# Patient Record
Sex: FEMALE | Race: WHITE | NOT HISPANIC OR LATINO
[De-identification: names, ages, dates, MRNs, and addresses within clinical notes are randomized per-mention and may not be internally consistent; named-entity substitution may affect disease eponyms.]

---

## 2018-07-26 ENCOUNTER — APPOINTMENT (OUTPATIENT)
Dept: UROGYNECOLOGY | Facility: CLINIC | Age: 63
End: 2018-07-26
Payer: COMMERCIAL

## 2018-07-26 LAB
BILIRUB UR QL STRIP: NORMAL
CLARITY UR: CLEAR
COLLECTION METHOD: NORMAL
GLUCOSE UR-MCNC: NORMAL
HCG UR QL: 0.2 EU/DL
HGB UR QL STRIP.AUTO: NORMAL
KETONES UR-MCNC: NORMAL
LEUKOCYTE ESTERASE UR QL STRIP: NORMAL
NITRITE UR QL STRIP: NORMAL
PH UR STRIP: 6
PROT UR STRIP-MCNC: NORMAL
SP GR UR STRIP: 1.02

## 2018-07-26 PROCEDURE — 99214 OFFICE O/P EST MOD 30 MIN: CPT

## 2020-12-27 ENCOUNTER — TRANSCRIPTION ENCOUNTER (OUTPATIENT)
Age: 65
End: 2020-12-27

## 2021-01-25 ENCOUNTER — TRANSCRIPTION ENCOUNTER (OUTPATIENT)
Age: 66
End: 2021-01-25

## 2021-01-30 ENCOUNTER — TRANSCRIPTION ENCOUNTER (OUTPATIENT)
Age: 66
End: 2021-01-30

## 2021-09-29 ENCOUNTER — APPOINTMENT (OUTPATIENT)
Dept: PULMONOLOGY | Facility: CLINIC | Age: 66
End: 2021-09-29

## 2022-11-16 ENCOUNTER — APPOINTMENT (OUTPATIENT)
Dept: ORTHOPEDIC SURGERY | Facility: CLINIC | Age: 67
End: 2022-11-16

## 2022-11-16 VITALS — WEIGHT: 198 LBS | HEIGHT: 68 IN | BODY MASS INDEX: 30.01 KG/M2

## 2022-11-16 DIAGNOSIS — Z00.00 ENCOUNTER FOR GENERAL ADULT MEDICAL EXAMINATION W/OUT ABNORMAL FINDINGS: ICD-10-CM

## 2022-11-16 PROCEDURE — 99203 OFFICE O/P NEW LOW 30 MIN: CPT

## 2022-11-16 PROCEDURE — L3908: CPT

## 2022-11-16 PROCEDURE — 73110 X-RAY EXAM OF WRIST: CPT | Mod: RT

## 2022-11-16 RX ORDER — MELOXICAM 15 MG/1
15 TABLET ORAL
Qty: 30 | Refills: 1 | Status: COMPLETED | COMMUNITY
Start: 2022-11-16 | End: 2023-01-15

## 2022-11-16 NOTE — ASSESSMENT
[FreeTextEntry1] : Mobic rx\par R wrist brace\par activity modification\par fu 2 weeks with dr leo, consider csi if not improved

## 2022-11-16 NOTE — HISTORY OF PRESENT ILLNESS
[8] : 8 [0] : 0 [Localized] : localized [Sharp] : sharp [Intermittent] : intermittent [Full time] : Work status: full time [de-identified] : 67 RHD female here with right wrist pain. No injury. Pain for a few months. Painover ulnar side of her wrist. No prior hand issues. Denies N/T. Denies trauma. has not tried any pain medication.  [] : Post Surgical Visit: no [FreeTextEntry1] : Rt hand/wrist [FreeTextEntry5] : Patient complains of wrist pains since 1 month ago\par no injury

## 2022-12-01 ENCOUNTER — APPOINTMENT (OUTPATIENT)
Dept: ORTHOPEDIC SURGERY | Facility: CLINIC | Age: 67
End: 2022-12-01

## 2022-12-01 VITALS — BODY MASS INDEX: 30.01 KG/M2 | HEIGHT: 68 IN | WEIGHT: 198 LBS

## 2022-12-01 DIAGNOSIS — S69.81XA OTHER SPECIFIED INJURIES OF RIGHT WRIST, HAND AND FINGER(S), INITIAL ENCOUNTER: ICD-10-CM

## 2022-12-01 DIAGNOSIS — M25.511 PAIN IN RIGHT SHOULDER: ICD-10-CM

## 2022-12-01 DIAGNOSIS — M75.101 UNSPECIFIED ROTATOR CUFF TEAR OR RUPTURE OF RIGHT SHOULDER, NOT SPECIFIED AS TRAUMATIC: ICD-10-CM

## 2022-12-01 PROCEDURE — 99214 OFFICE O/P EST MOD 30 MIN: CPT | Mod: 25

## 2022-12-01 PROCEDURE — 20610 DRAIN/INJ JOINT/BURSA W/O US: CPT | Mod: RT

## 2022-12-01 PROCEDURE — J3490N: CUSTOM

## 2022-12-01 PROCEDURE — 73030 X-RAY EXAM OF SHOULDER: CPT | Mod: RT

## 2022-12-01 NOTE — IMAGING
[Right] : right shoulder [Type 2 acromion] : Type 2 acromion [de-identified] : Right wrist with ttp over the TFCC region with minimally positive TFCC Grind Test.\par Strength is 5/5 in all planes.\par ROM is full with no significant pain noted.\par Davis and Finkelstein Tests are negative.\par Tinel sign is negative over the carpal tunnel.\par All digits are nvi with FAROM. \par \par Right shoulder with no skin change/bony deformity.\par TTP over lateral subacromial region.\par +impingement Signs noted.\par Walker Test is negative.\par Speed and Yergason Signs are negative.\par Posterior Lift Off is negative. \par Apprehension Sign is negative. [FreeTextEntry1] : no acute bony pathology / bone island noted to proximal humerus.

## 2022-12-01 NOTE — ASSESSMENT
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

## 2022-12-01 NOTE — PROCEDURE
[Large Joint Injection] : Large joint injection [Right] : of the right [Subacromial Space] : subacromial space [Pain] : pain [Inflammation] : inflammation [Alcohol] : alcohol [Ethyl Chloride sprayed topically] : ethyl chloride sprayed topically [Sterile technique used] : sterile technique used [___ cc    1%] : Lidocaine ~Vcc of 1%  [___ cc    0.5%] : Bupivacaine (Marcaine) ~Vcc of 0.5%  [___ cc    40mg] : Triamcinolone (Kenalog) ~Vcc of 40 mg  [] : Patient tolerated procedure well [Call if redness, pain or fever occur] : call if redness, pain or fever occur [Apply ice for 15min out of every hour for the next 12-24 hours as tolerated] : apply ice for 15 minutes out of every hour for the next 12-24 hours as tolerated [Previous OTC use and PT nontherapeutic] : patient has tried OTC's including aspirin, Ibuprofen, Aleve, etc or prescription NSAIDS, and/or exercises at home and/or physical therapy without satisfactory response [Patient had decreased mobility in the joint] : patient had decreased mobility in the joint [Risks, benefits, alternatives discussed / Verbal consent obtained] : the risks benefits, and alternatives have been discussed, and verbal consent was obtained

## 2022-12-01 NOTE — HISTORY OF PRESENT ILLNESS
[6] : 6 [2] : 2 [Intermittent] : intermittent [Nothing helps with pain getting better] : Nothing helps with pain getting better [Full time] : Work status: full time [de-identified] : 12/1/2022: Pt here for fu s/p treatment for right TFCC injury treated with bracing and otc nsaid with good pain relief.\par  states her pain to the wrist has improved, however she states that her right shoulder has been bothering her over the past few weeks, with no hx of trauma.\par Pain is worse with arm elevation. \par Pt denies associated numbness/tingling. \par \par Urgent Care note below:\par 11/16/2022: 67 RHD female here with right wrist pain. No injury. Pain for a few months. Painover ulnar side of her wrist. No prior hand issues. Denies N/T. Denies trauma. has not tried any pain medication.  [] : no [FreeTextEntry5] : 12/01/2022 :JOSE DANIEL ROBERT , a 67 year old female, presents today for new consultation  right wrist pain, was seen by LORRAINE Robert had xrays in [place] office.

## 2022-12-01 NOTE — REASON FOR VISIT
[FreeTextEntry2] : 12/01/2022 :JOSE DANIEL ROBERT , a 67 year old female, presents today for new consultation  right wrist pain, was seen by LORARINE Robert had xrays in [place] office.\par

## 2023-01-12 ENCOUNTER — APPOINTMENT (OUTPATIENT)
Dept: ORTHOPEDIC SURGERY | Facility: CLINIC | Age: 68
End: 2023-01-12

## 2023-03-25 ENCOUNTER — APPOINTMENT (OUTPATIENT)
Dept: ORTHOPEDIC SURGERY | Facility: CLINIC | Age: 68
End: 2023-03-25
Payer: MEDICARE

## 2023-03-25 VITALS — BODY MASS INDEX: 30.01 KG/M2 | HEIGHT: 68 IN | WEIGHT: 198 LBS

## 2023-03-25 DIAGNOSIS — S93.602A UNSPECIFIED SPRAIN OF LEFT FOOT, INITIAL ENCOUNTER: ICD-10-CM

## 2023-03-25 DIAGNOSIS — S93.601A UNSPECIFIED SPRAIN OF RIGHT FOOT, INITIAL ENCOUNTER: ICD-10-CM

## 2023-03-25 PROCEDURE — L4361: CPT | Mod: KX,RT

## 2023-03-25 PROCEDURE — 99214 OFFICE O/P EST MOD 30 MIN: CPT

## 2023-03-25 NOTE — DATA REVIEWED
[FreeTextEntry1] : OUTSIDE XRs 3/24/23 BL FEET:  Displaced, comminuted R MT fracture.  ?L fracture of 1st distal phalanx vs osteophyte.

## 2023-03-25 NOTE — HISTORY OF PRESENT ILLNESS
[Sudden] : sudden [9] : 9 [10] : 10 [Dull/Aching] : dull/aching [] : no [FreeTextEntry1] : feet [FreeTextEntry3] : 3/24/23 [FreeTextEntry5] : she tripped over a step and fell. R is worse. in wheelchair. she went to [facility] yesterday and had xrays,splinted, was told fx [de-identified] : activity [de-identified] : Right toe sx over 10 years ago

## 2023-03-25 NOTE — ASSESSMENT
[FreeTextEntry1] : We reviewed the findings and her options.  Discussed poor healing of 5th MT fractures.  Tall CAM boot applied.  She has crutches home, but was instructed on proper use in the office today.  Rx for knee scooter given.  She has an upcoming epidural (3/29/23) for LS radiculitis.  Today is the last day she can take NSAIDs.  Meds outlined.  Will see Dr. Schaffer on Monday for future management.

## 2023-03-25 NOTE — REASON FOR VISIT
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  She had immediate pain.  She has had stress fractures previously.  No osteoporosis (last DEXA in 2022).  She takes Vitamin D but has hypercalcemia, so no calcium supplementation.  Was seen at [facility] ED and dx with right 5th MT fracture and ?L big toe fracture.  On Tylenol and icing.  In SLS on the right, supportive cross training sneaker on the left.  No numnbess.

## 2023-03-25 NOTE — PHYSICAL EXAM
[Right] : right foot and ankle [Moderate] : moderate swelling of lateral foot [5th] : 5th [Left] : left foot and ankle [NL (40)] : plantar flexion 40 degrees [NL 30)] : inversion 30 degrees [NL (20)] : eversion 20 degrees [2+] : posterior tibialis pulse: 2+ [] : no laceration [FreeTextEntry9] : ROM and strength not stressed.  Good passive ROM.  No signs of instability or gross ligament injury. [FreeTextEntry8] : Minimal gross dorsal foot tenderness.  Non tender 1st toe.

## 2023-03-27 ENCOUNTER — APPOINTMENT (OUTPATIENT)
Dept: ORTHOPEDIC SURGERY | Facility: CLINIC | Age: 68
End: 2023-03-27
Payer: MEDICARE

## 2023-03-27 VITALS — HEIGHT: 68 IN | BODY MASS INDEX: 30.01 KG/M2 | WEIGHT: 198 LBS

## 2023-03-27 DIAGNOSIS — M20.22 HALLUX RIGIDUS, LEFT FOOT: ICD-10-CM

## 2023-03-27 DIAGNOSIS — M79.673 PAIN IN UNSPECIFIED FOOT: ICD-10-CM

## 2023-03-27 PROCEDURE — 73630 X-RAY EXAM OF FOOT: CPT | Mod: 50

## 2023-03-27 PROCEDURE — 99214 OFFICE O/P EST MOD 30 MIN: CPT | Mod: 25

## 2023-03-27 PROCEDURE — 28470 CLTX METATARSAL FX WO MNP EA: CPT

## 2023-03-27 NOTE — HISTORY OF PRESENT ILLNESS
[10] : 10 [9] : 9 [Dull/Aching] : dull/aching [de-identified] : 03/27/2023: 3 days foot pain from missing a step. Right sided lateral midfoot pain. Left sided hallux pain. Had xrays from ED, then saw [facility] - given tall cam and crutches. wb in boot w/ crutches.  h/o stress fx 10+ yrs ago. no n/t. +dm (a1c 6.4). no tob [] : Post Surgical Visit: no [FreeTextEntry1] : ryan foot [FreeTextEntry3] : 3.24.23

## 2023-03-27 NOTE — ASSESSMENT
[FreeTextEntry1] : nwb in cam boot\par ice/elevate\par nsaids prn\par patient asking about surgical treatment -- advised zone 1 fxs due typically heal with nonsurgical care and that she would be at increased risk of complication in setting of dm. will proceed with non surgical care\par f/up 3 wks w/ R foot xray

## 2023-03-27 NOTE — PHYSICAL EXAM
[Right] : right foot and ankle [Mild] : mild swelling of lateral foot [Left] : left foot and ankle [1st] : 1st [NL (40)] : plantar flexion 40 degrees [NL 30)] : inversion 30 degrees [NL (20)] : eversion 20 degrees [5___] : eversion 5[unfilled]/5 [2+] : dorsalis pedis pulse: 2+ [] : Sensation present to light touch in all distributions [TWNoteComboBox7] : dorsiflexion 15 degrees

## 2023-04-03 ENCOUNTER — APPOINTMENT (OUTPATIENT)
Dept: ORTHOPEDIC SURGERY | Facility: CLINIC | Age: 68
End: 2023-04-03
Payer: MEDICARE

## 2023-04-03 VITALS — BODY MASS INDEX: 30.01 KG/M2 | WEIGHT: 198 LBS | HEIGHT: 68 IN

## 2023-04-03 DIAGNOSIS — S92.351A DISPLACED FRACTURE OF FIFTH METATARSAL BONE, RIGHT FOOT, INITIAL ENCOUNTER FOR CLOSED FRACTURE: ICD-10-CM

## 2023-04-03 PROCEDURE — 99024 POSTOP FOLLOW-UP VISIT: CPT

## 2023-04-03 NOTE — ASSESSMENT
[FreeTextEntry1] : Discussed importance of ice and elevation\par Went over proper elevation techniques \par 2+ pulses and cap refill on exam\par Follow up with Dr. Schaffer as scheduled - will come sooner if any other issues

## 2023-04-03 NOTE — PHYSICAL EXAM
[Right] : right foot and ankle [Moderate] : moderate swelling of lateral foot [5___] : dorsiflexion 5[unfilled]/5 [2+] : posterior tibialis pulse: 2+ [] : Sensation present to light touch in all distributions [de-identified] : NWB with cam boot and crutches [de-identified] : plantar flexion 25 degrees [TWNoteComboBox7] : dorsiflexion 10 degrees

## 2023-04-03 NOTE — HISTORY OF PRESENT ILLNESS
[8] : 8 [de-identified] : Patient is a 66 yo female c/o n/t in right foot. Currently being treated for 5th metatarsal fracture NWB in cam boot with Dr. Schaffer. States she has not been elevating as much as she should due to work. Taking advil as needed for pain.  [FreeTextEntry5] : pt c.o pain in rt foot, pt states previous treatment with Dr. Schaffer 03/27/23\par pt states she had an epidural past wednesday

## 2023-04-17 ENCOUNTER — APPOINTMENT (OUTPATIENT)
Dept: ORTHOPEDIC SURGERY | Facility: CLINIC | Age: 68
End: 2023-04-17
Payer: MEDICARE

## 2023-04-17 PROCEDURE — 73630 X-RAY EXAM OF FOOT: CPT | Mod: RT

## 2023-04-17 PROCEDURE — 99024 POSTOP FOLLOW-UP VISIT: CPT

## 2023-04-17 NOTE — HISTORY OF PRESENT ILLNESS
[7] : 7 [5] : 5 [Dull/Aching] : dull/aching [de-identified] : 03/27/2023: 3 days foot pain from missing a step. Right sided lateral midfoot pain. Left sided hallux pain. Had xrays from ED, then saw [facility] - given tall cam and crutches. wb in boot w/ crutches.  h/o stress fx 10+ yrs ago. no n/t. +dm (a1c 6.4). no tob\par \par 4/17/23: continued pain. fully weight bearing in boot despite instructions.  [FreeTextEntry1] : ryan foot [] : Post Surgical Visit: no [FreeTextEntry3] : 3.24.23

## 2023-04-17 NOTE — PHYSICAL EXAM
[Right] : right foot and ankle [Mild] : mild swelling of lateral foot [Left] : left foot and ankle [1st] : 1st [NL (40)] : plantar flexion 40 degrees [NL 30)] : inversion 30 degrees [NL (20)] : eversion 20 degrees [5___] : eversion 5[unfilled]/5 [2+] : dorsalis pedis pulse: 2+ [TWNoteComboBox7] : dorsiflexion 15 degrees [] : negative anterior drawer at ankle

## 2023-04-17 NOTE — ASSESSMENT
[FreeTextEntry1] : nwb in cam boot -- reiterated importance. not compliant currently\par ice/elevate\par nsaids prn\par discussed risk of nonunion with continued weight bearing\par f/up 3 wks w/ foot xray

## 2023-04-30 ENCOUNTER — APPOINTMENT (OUTPATIENT)
Dept: ORTHOPEDIC SURGERY | Facility: CLINIC | Age: 68
End: 2023-04-30
Payer: MEDICARE

## 2023-04-30 VITALS — BODY MASS INDEX: 30.01 KG/M2 | WEIGHT: 198 LBS | HEIGHT: 68 IN

## 2023-04-30 PROCEDURE — L3260: CPT | Mod: KX,RT

## 2023-04-30 PROCEDURE — 99213 OFFICE O/P EST LOW 20 MIN: CPT | Mod: 24

## 2023-04-30 PROCEDURE — L4361: CPT | Mod: KX,RT

## 2023-04-30 PROCEDURE — 73630 X-RAY EXAM OF FOOT: CPT | Mod: RT

## 2023-04-30 RX ORDER — CELECOXIB 200 MG/1
200 CAPSULE ORAL TWICE DAILY
Qty: 60 | Refills: 0 | Status: ACTIVE | COMMUNITY
Start: 2023-04-30 | End: 1900-01-01

## 2023-04-30 NOTE — ASSESSMENT
[FreeTextEntry1] : A/P R 5th MT fracture\par - WBAT\par - cam walker and HSS applied to patient \par - ice/elevation\par - f/u foot/ankle as scheduled\par

## 2023-04-30 NOTE — HISTORY OF PRESENT ILLNESS
[10] : 10 [7] : 7 [de-identified] : 68 y/o F s/p fall yesterday with R foot pain. she has R foot 5th MT fracture being treated by dr. campbell frazier 5 weeks ago. in cam walker, WBAT. denies numbness/tingling. [FreeTextEntry5] : pt fell yesterday 04/29/23 and injured rt foot, pt saw dr. hightower 03/27/23 and had a boot \par

## 2023-04-30 NOTE — IMAGING
[de-identified] : PE R foot: +swelling to lateral aspect of foot, +tenderness over 5th MT, no tenderness at lisfranc area, no tenderness at ankle, EHL/FHL/ADF/APF intact, sensory intact, calves soft, NT\par  [Right] : right foot [The fracture is in acceptable alignment. There is progression in healing seen] : The fracture is in acceptable alignment. There is progression in healing seen [de-identified] : known R 5th MT fracture

## 2023-05-04 ENCOUNTER — APPOINTMENT (OUTPATIENT)
Dept: ORTHOPEDIC SURGERY | Facility: CLINIC | Age: 68
End: 2023-05-04
Payer: MEDICARE

## 2023-05-04 VITALS — BODY MASS INDEX: 30.01 KG/M2 | HEIGHT: 68 IN | WEIGHT: 198 LBS

## 2023-05-04 PROCEDURE — 99024 POSTOP FOLLOW-UP VISIT: CPT

## 2023-05-04 NOTE — IMAGING
[Right] : right foot [Outside films reviewed] : Outside films reviewed [de-identified] : 5th mt avuslion fx - no sig healing seen

## 2023-05-04 NOTE — HISTORY OF PRESENT ILLNESS
[7] : 7 [5] : 5 [Dull/Aching] : dull/aching [de-identified] : 03/27/2023: 3 days foot pain from missing a step. Right sided lateral midfoot pain. Left sided hallux pain. Had xrays from ED, then saw [facility] - given tall cam and crutches. wb in boot w/ crutches.  h/o stress fx 10+ yrs ago. no n/t. +dm (a1c 6.4). no tob\par \par 4/17/23: continued pain. fully weight bearing in boot despite instructions. \par \par 05/04/2023: had new fall since last visit. remains non compliant with weight bearing precautions. using short boot [] : Post Surgical Visit: no [FreeTextEntry1] : ryan foot [FreeTextEntry3] : 3.24.23

## 2023-05-04 NOTE — PHYSICAL EXAM
[Right] : right foot and ankle [Mild] : mild swelling of lateral foot [Left] : left foot and ankle [1st] : 1st [NL (40)] : plantar flexion 40 degrees [NL 30)] : inversion 30 degrees [NL (20)] : eversion 20 degrees [5___] : eversion 5[unfilled]/5 [2+] : dorsalis pedis pulse: 2+ [] : negative anterior drawer at ankle [TWNoteComboBox7] : dorsiflexion 15 degrees

## 2023-05-04 NOTE — ASSESSMENT
[FreeTextEntry1] : nwb in cam boot -- reiterated importance. remains non compliant. new rx for walker given today\par ice/elevate\par nsaids prn\par discussed risk of nonunion especially in setting of continued non compliance. discussed nonunion and possible surgery as outcome if this occurs. patient states understanding of this\par f/up 3 wks w/ foot xray

## 2023-05-18 ENCOUNTER — TRANSCRIPTION ENCOUNTER (OUTPATIENT)
Age: 68
End: 2023-05-18

## 2023-05-18 ENCOUNTER — APPOINTMENT (OUTPATIENT)
Dept: ORTHOPEDIC SURGERY | Facility: CLINIC | Age: 68
End: 2023-05-18
Payer: MEDICARE

## 2023-05-18 DIAGNOSIS — S92.351D DISPLACED FRACTURE OF FIFTH METATARSAL BONE, RIGHT FOOT, SUBSEQUENT ENCOUNTER FOR FRACTURE WITH ROUTINE HEALING: ICD-10-CM

## 2023-05-18 PROCEDURE — 73630 X-RAY EXAM OF FOOT: CPT | Mod: RT

## 2023-05-18 PROCEDURE — 99024 POSTOP FOLLOW-UP VISIT: CPT

## 2023-05-18 NOTE — ASSESSMENT
[FreeTextEntry1] : protected wb in cam boot\par ice/elevate\par nsaids prn\par discussed risk of nonunion especially in setting of continued non compliance. discussed nonunion and possible surgery as outcome if this occurs. patient states understanding of this\par f/up 3 wks w/ foot xray

## 2023-05-18 NOTE — PHYSICAL EXAM
[Right] : right foot and ankle [Mild] : mild swelling of lateral foot [Left] : left foot and ankle [1st] : 1st [NL (40)] : plantar flexion 40 degrees [NL 30)] : inversion 30 degrees [NL (20)] : eversion 20 degrees [5___] : eversion 5[unfilled]/5 [2+] : dorsalis pedis pulse: 2+ [] : negative anterior drawer at ankle [FreeTextEntry8] : improved [de-identified] : rollator [TWNoteComboBox7] : dorsiflexion 15 degrees

## 2023-05-18 NOTE — IMAGING
[Right] : right foot [Outside films reviewed] : Outside films reviewed [de-identified] : 5th mt avuslion fx - no sig healing seen

## 2023-05-18 NOTE — HISTORY OF PRESENT ILLNESS
[7] : 7 [5] : 5 [Dull/Aching] : dull/aching [de-identified] : 03/27/2023: 3 days foot pain from missing a step. Right sided lateral midfoot pain. Left sided hallux pain. Had xrays from ED, then saw [facility] - given tall cam and crutches. wb in boot w/ crutches.  h/o stress fx 10+ yrs ago. no n/t. +dm (a1c 6.4). no tob\par \par 4/17/23: continued pain. fully weight bearing in boot despite instructions. \par \par 05/04/2023: had new fall since last visit. remains non compliant with weight bearing precautions. using short boot\par \par 05/18/2023: no pain.  Mostly NWB in boot using scooter [] : Post Surgical Visit: no [FreeTextEntry1] : ryan foot [FreeTextEntry3] : 3.24.23

## 2023-06-08 ENCOUNTER — APPOINTMENT (OUTPATIENT)
Dept: ORTHOPEDIC SURGERY | Facility: CLINIC | Age: 68
End: 2023-06-08
Payer: MEDICARE

## 2023-06-08 VITALS — BODY MASS INDEX: 30.01 KG/M2 | WEIGHT: 198 LBS | HEIGHT: 68 IN

## 2023-06-08 DIAGNOSIS — S92.351G DISPLACED FRACTURE OF FIFTH METATARSAL BONE, RIGHT FOOT, SUBSEQUENT ENCOUNTER FOR FRACTURE WITH DELAYED HEALING: ICD-10-CM

## 2023-06-08 PROCEDURE — 73630 X-RAY EXAM OF FOOT: CPT | Mod: RT

## 2023-06-08 PROCEDURE — 99024 POSTOP FOLLOW-UP VISIT: CPT

## 2023-06-08 NOTE — PHYSICAL EXAM
[Right] : right foot and ankle [Left] : left foot and ankle [1st] : 1st [NL (40)] : plantar flexion 40 degrees [NL 30)] : inversion 30 degrees [NL (20)] : eversion 20 degrees [5___] : eversion 5[unfilled]/5 [2+] : dorsalis pedis pulse: 2+ [] : Sensation present to light touch in all distributions [TWNoteComboBox7] : False

## 2023-06-08 NOTE — HISTORY OF PRESENT ILLNESS
[7] : 7 [5] : 5 [Dull/Aching] : dull/aching [de-identified] : 03/27/2023: 3 days foot pain from missing a step. Right sided lateral midfoot pain. Left sided hallux pain. Had xrays from ED, then saw [facility]READ[facility] - given tall cam and crutches. wb in boot w/ crutches.  h/o stress fx 10+ yrs ago. no n/t. +dm (a1c 6.4). no tob\par \par 4/17/23: continued pain. fully weight bearing in boot despite instructions. \par \par 05/04/2023: had new fall since last visit. remains non compliant with weight bearing precautions. using short boot\par \par 05/18/2023: no pain.  Mostly NWB in boot using scooter\par \par 6/8/23: no pain. walking in boot [] : Post Surgical Visit: no [FreeTextEntry1] : b/l foot [FreeTextEntry3] : 3.24.23

## 2023-06-08 NOTE — ASSESSMENT
[FreeTextEntry1] : no pain currently\par discussed possible fibrous union -- as no pain, would not intervene surgically\par supportive shoe\par f/up 4 wks w/ foot xrays

## 2023-07-06 ENCOUNTER — APPOINTMENT (OUTPATIENT)
Dept: ORTHOPEDIC SURGERY | Facility: CLINIC | Age: 68
End: 2023-07-06
Payer: MEDICARE

## 2023-07-06 VITALS — HEIGHT: 68 IN | BODY MASS INDEX: 30.01 KG/M2 | WEIGHT: 198 LBS

## 2023-07-06 PROCEDURE — 99214 OFFICE O/P EST MOD 30 MIN: CPT

## 2023-07-06 PROCEDURE — 73630 X-RAY EXAM OF FOOT: CPT | Mod: RT

## 2023-07-06 NOTE — PHYSICAL EXAM
[Right] : right foot and ankle [Left] : left foot and ankle [1st] : 1st [NL (40)] : plantar flexion 40 degrees [NL 30)] : inversion 30 degrees [NL (20)] : eversion 20 degrees [5___] : eversion 5[unfilled]/5 [2+] : dorsalis pedis pulse: 2+ [] : negative anterior drawer at ankle

## 2023-07-06 NOTE — HISTORY OF PRESENT ILLNESS
[0] : 0 [de-identified] : 03/27/2023: 3 days foot pain from missing a step. Right sided lateral midfoot pain. Left sided hallux pain. Had xrays from ED, then saw [facility]READ[facility] - given tall cam and crutches. wb in boot w/ crutches.  h/o stress fx 10+ yrs ago. no n/t. +dm (a1c 6.4). no tob\par \par 4/17/23: continued pain. fully weight bearing in boot despite instructions. \par \par 05/04/2023: had new fall since last visit. remains non compliant with weight bearing precautions. using short boot\par \par 05/18/2023: no pain.  Mostly NWB in boot using scooter\par \par 6/8/23: no pain. walking in boot\par \par 07/06/2023: no pain. walking in reg shoes [] : Post Surgical Visit: no [FreeTextEntry1] : b/l foot [FreeTextEntry3] : 3.24.23

## 2023-07-06 NOTE — ASSESSMENT
[FreeTextEntry1] : no sig pain currently\par not interested in surgyer\par discussed possible fibrous union -- as no pain, would not intervene surgically\par rec bone stim\par supportive shoe\par f/up 6 wks w/ foot xrays

## 2023-09-27 ENCOUNTER — APPOINTMENT (OUTPATIENT)
Dept: ORTHOPEDIC SURGERY | Facility: CLINIC | Age: 68
End: 2023-09-27
Payer: MEDICARE

## 2023-09-27 VITALS — WEIGHT: 198 LBS | HEIGHT: 68 IN | BODY MASS INDEX: 30.01 KG/M2

## 2023-09-27 DIAGNOSIS — S92.351K DISPLACED FRACTURE OF FIFTH METATARSAL BONE, RIGHT FOOT, SUBSEQUENT ENCOUNTER FOR FRACTURE WITH NONUNION: ICD-10-CM

## 2023-09-27 DIAGNOSIS — S86.891A OTHER INJURY OF OTHER MUSCLE(S) AND TENDON(S) AT LOWER LEG LEVEL, RIGHT LEG, INITIAL ENCOUNTER: ICD-10-CM

## 2023-09-27 PROCEDURE — 73630 X-RAY EXAM OF FOOT: CPT | Mod: RT

## 2023-09-27 PROCEDURE — 73590 X-RAY EXAM OF LOWER LEG: CPT | Mod: RT

## 2023-09-27 PROCEDURE — 99214 OFFICE O/P EST MOD 30 MIN: CPT

## 2024-03-22 ENCOUNTER — APPOINTMENT (OUTPATIENT)
Dept: ORTHOPEDIC SURGERY | Facility: CLINIC | Age: 69
End: 2024-03-22
Payer: MEDICARE

## 2024-03-22 VITALS — WEIGHT: 198 LBS | HEIGHT: 68 IN | BODY MASS INDEX: 30.01 KG/M2

## 2024-03-22 DIAGNOSIS — M87.052 IDIOPATHIC ASEPTIC NECROSIS OF LEFT FEMUR: ICD-10-CM

## 2024-03-22 PROCEDURE — 99214 OFFICE O/P EST MOD 30 MIN: CPT

## 2024-03-22 PROCEDURE — 73503 X-RAY EXAM HIP UNI 4/> VIEWS: CPT | Mod: LT

## 2024-03-22 PROCEDURE — 99204 OFFICE O/P NEW MOD 45 MIN: CPT

## 2024-03-22 NOTE — HISTORY OF PRESENT ILLNESS
[9] : 9 [de-identified] : 3/22/24  JOSE DANIEL ROBERT is a 68 year female here with left  hip pain.  Pain has been present for a few years and is located left groin and lateral hip with some radiation down the anterior aspect of her thigh.  She does have a history of sciatica and lower back issues and has had multiple ROXANE's for this.  She states her sciatica symptoms felt much different than her current symptoms and her ROXANE's did not help with the left hip pain.   Injury - no Mechanical symptoms - yes Exacerbating factors/activities/positions - yes Pain during or after activity - yes Back pain - no Radicular pain - yes   Previous Treatment: NSAIDs: no PT: no Activity Mods: no Surgery: no   Injections: no Date of last injection: no Numbing phase relief: no Steroid phase relief: no     Occupation: [ ] Sports/Recreational Activities: [ ] [] : Post Surgical Visit: no [FreeTextEntry5] : 68 y.o patient here for left hip pain. States she got an mri and saw a neurologist.

## 2024-03-22 NOTE — DISCUSSION/SUMMARY
[de-identified] : JOSE DANIEL ROBERT has L hip avascular necrosis.  she has tried and failed conservative treatment with anti-inflammatories physical therapy.  We briefly discussed injection today however in the presence of AVN this could potentially cause her hip joint to wear out faster so we both elected to defer this as this would necessitate a 3-month wait from the date of injection and she is in severe pain.   Risks and benefits of the surgery including but not limited to the below were discussed at length with the patient today.  Risks of total hip arthroplasty include infection which usually requires more surgery ranging from a debridement with changing of parts to more complex surgery requiring a temporary hip replacement and IV antibiotics before a new hip is put in place.  Other risks discussed include damage to neurovascular structures, dislocation, loosening of parts, fracture, blood clot and requirement for blood thinners after surgery, and risks related to anesthesia including cardiovascular, pulmonary, and neurological complications including death.  she was able to state understanding and were agreeable to proceed with surgery.    Prior to appointment and during encounter with patient extensive medical records were reviewed including but not limited to, hospital records, out patient records, imaging results, and lab data. During this appointment the patient was examined, diagnoses were discussed and explained in a face to face manner. In addition extensive time was spent reviewing aforementioned diagnostic studies. Counseling including abnormal image results, differential diagnoses, treatment options, risk and benefits, lifestyle changes, current condition, and current medications was performed. Patient's comments, questions, and concerns were address and patient verbalized understanding.

## 2024-03-22 NOTE — DATA REVIEWED
[MRI] : MRI [Left] : left [Hip] : hip [I independently reviewed and interpreted images and report] : I independently reviewed and interpreted images and report [FreeTextEntry1] : MRI of the left hip demonstrates avascular necrosis with collapse of the superior lateral bone in the femoral head.  Moderate to severe degenerative changes within the hip joint.

## 2024-03-29 ENCOUNTER — APPOINTMENT (OUTPATIENT)
Dept: ORTHOPEDIC SURGERY | Facility: CLINIC | Age: 69
End: 2024-03-29

## 2024-04-11 ENCOUNTER — APPOINTMENT (OUTPATIENT)
Dept: ORTHOPEDIC SURGERY | Facility: CLINIC | Age: 69
End: 2024-04-11

## 2024-04-23 ENCOUNTER — APPOINTMENT (OUTPATIENT)
Dept: ORTHOPEDIC SURGERY | Facility: HOSPITAL | Age: 69
End: 2024-04-23

## 2024-04-26 ENCOUNTER — APPOINTMENT (OUTPATIENT)
Dept: ORTHOPEDIC SURGERY | Facility: HOSPITAL | Age: 69
End: 2024-04-26

## 2024-05-13 ENCOUNTER — APPOINTMENT (OUTPATIENT)
Dept: ORTHOPEDIC SURGERY | Facility: CLINIC | Age: 69
End: 2024-05-13

## 2024-07-16 ENCOUNTER — APPOINTMENT (OUTPATIENT)
Dept: SURGERY | Facility: CLINIC | Age: 69
End: 2024-07-16

## 2024-07-25 ENCOUNTER — APPOINTMENT (OUTPATIENT)
Dept: SURGERY | Facility: CLINIC | Age: 69
End: 2024-07-25
Payer: MEDICARE

## 2024-07-25 VITALS
DIASTOLIC BLOOD PRESSURE: 83 MMHG | WEIGHT: 202 LBS | HEIGHT: 68 IN | SYSTOLIC BLOOD PRESSURE: 129 MMHG | HEART RATE: 67 BPM | BODY MASS INDEX: 30.62 KG/M2

## 2024-07-25 DIAGNOSIS — E21.3 HYPERPARATHYROIDISM, UNSPECIFIED: ICD-10-CM

## 2024-07-25 PROCEDURE — 99204 OFFICE O/P NEW MOD 45 MIN: CPT | Mod: 25

## 2024-07-25 PROCEDURE — 36415 COLL VENOUS BLD VENIPUNCTURE: CPT

## 2024-07-25 PROCEDURE — 31575 DIAGNOSTIC LARYNGOSCOPY: CPT

## 2024-07-25 RX ORDER — LEVOTHYROXINE SODIUM 88 UG/1
88 TABLET ORAL
Refills: 0 | Status: ACTIVE | COMMUNITY

## 2024-07-25 RX ORDER — DULOXETINE HYDROCHLORIDE 30 MG/1
30 CAPSULE, DELAYED RELEASE PELLETS ORAL
Refills: 0 | Status: ACTIVE | COMMUNITY

## 2024-07-25 RX ORDER — METFORMIN HYDROCHLORIDE 500 MG/1
500 TABLET, COATED ORAL
Refills: 0 | Status: ACTIVE | COMMUNITY

## 2024-07-25 NOTE — CONSULT LETTER
[Dear  ___] : Dear  [unfilled], [Consult Letter:] : I had the pleasure of evaluating your patient, [unfilled]. [Please see my note below.] : Please see my note below. [Consult Closing:] : Thank you very much for allowing me to participate in the care of this patient.  If you have any questions, please do not hesitate to contact me. [Sincerely,] : Sincerely, [FreeTextEntry2] : Dr. Elena Espino, Dr. Elvis Landon [DrSade  ___] : Dr. WALLER [FreeTextEntry3] : Jose L Dennis MD, FACS System Director, Endocrine Surgery [facility] Associate  Professor of Surgery [employer] at [facility]

## 2024-07-25 NOTE — PHYSICAL EXAM
[de-identified] : well healed scar [de-identified] : 1.2 cm left thyroid nodule, well circumscribed and mobile [Laryngoscopy Performed] : laryngoscopy was performed, see procedure section for findings [Midline] : located in midline position [Normal] : orientation to person, place, and time: normal [de-identified] : fiberoptic laryngoscopy shows normal vocal cord mobility bilaterally with no lesions noted

## 2024-07-25 NOTE — ASSESSMENT
[FreeTextEntry1] : lengthy discussion regarding options for management. in view of labs and symptoms have recommended minimally invasive parathyroidectomy with PTH assay.  will require preop 4D CT.  risks, benefits and alternatives discussed at length.  I have discussed with the patient the anatomy of the area, the pathophysiology of the disease process and the rationale for surgery.  The attendant risks, possible complications and expected postoperative course have been discussed in detail.  I have given the patient the opportunity to ask questions, and all questions have been answered to the patient's satisfaction, and they wish to proceed with the planned procedure.  to be scheduled ambulatory at [facility]. requested 24 hour urine calcium. bloods drawn. to call next week for results. will require recurrent nerve monitoring. potential for hypoparathyroidism due to unknown fate of right parathyroids discussed.  ([id number])

## 2024-07-25 NOTE — HISTORY OF PRESENT ILLNESS
[de-identified] : Pt c/o kidney stone, bone pain fatigue and elevated calcium. denies constipation, dysphagia, hoarseness, SOB or RT exposure.  s/p right thyroid lobectomy for benign disease at [facility] 2008.  Ca 11.6, PTH 56, Normal TFTs sonogram: Right lobe absent, left 1.3 cm thyroid nodule bone density: osteopenia I have reviewed all old and new data and available images.

## 2024-07-26 ENCOUNTER — APPOINTMENT (OUTPATIENT)
Dept: CT IMAGING | Facility: CLINIC | Age: 69
End: 2024-07-26

## 2024-07-26 PROCEDURE — 70492 CT SFT TSUE NCK W/O & W/DYE: CPT | Mod: 26,MH

## 2024-07-27 LAB
25(OH)D3 SERPL-MCNC: 49.6 NG/ML
CALCIUM SERPL-MCNC: 10.4 MG/DL
CALCIUM SERPL-MCNC: 10.4 MG/DL
PARATHYROID HORMONE INTACT: 63 PG/ML
T3 SERPL-MCNC: 81 NG/DL
T4 FREE SERPL-MCNC: 1.6 NG/DL
THYROGLOB AB SERPL-ACNC: <20 IU/ML
THYROPEROXIDASE AB SERPL IA-ACNC: <10 IU/ML
TSH SERPL-ACNC: 1.34 UIU/ML

## 2024-07-30 LAB
CAU: 15 MG/DL
CREAT 24H UR-MCNC: 0.7 G/24 H
CREAT ?TM UR-MCNC: 111 MG/DL
PROT ?TM UR-MCNC: 24 HR
SPECIMEN VOL 24H UR: 650 ML
SPECIMEN VOL 24H UR: 98 MG/24 H

## 2024-08-23 NOTE — ASU PATIENT PROFILE, ADULT - FALL HARM RISK - UNIVERSAL INTERVENTIONS
Bed in lowest position, wheels locked, appropriate side rails in place/Call bell, personal items and telephone in reach/Instruct patient to call for assistance before getting out of bed or chair/Non-slip footwear when patient is out of bed/[place] to call system/Physically safe environment - no spills, clutter or unnecessary equipment/Purposeful Proactive Rounding/Room/bathroom lighting operational, light cord in reach

## 2024-08-23 NOTE — ASU PATIENT PROFILE, ADULT - NSICDXPASTMEDICALHX_GEN_ALL_CORE_FT
PAST MEDICAL HISTORY:  Female Stress Incontinence     GERD (Gastroesophageal Reflux Disease) treated with meds    H/O allergy adhesive and angiocath    HTN (Hypertension) treated with meds    Hypercholesteremia treated with meds    Hypothyroidism treated with meds    Renal Calculi 2002, passed on own    Thyroid Nodule treated with surgery 2008

## 2024-08-23 NOTE — ASU PATIENT PROFILE, ADULT - FALL HARM RISK - PT AGE POPULATION HIDDEN
----- Message from Nafisa ELLISON Brittneyseema sent at 4/11/2019  2:48 PM CDT -----  Regarding: Bone density order  New bone density order required.  The one in Epic was entered by the MA and not sent for co-signature.  Patient is scheduled for April 18, 2019.         Adult

## 2024-08-23 NOTE — ASU PATIENT PROFILE, ADULT - NSICDXPASTSURGICALHX_GEN_ALL_CORE_FT
PAST SURGICAL HISTORY:  S/P hip replacement, left     S/P Knee Surgery arthroscopy 2004 right    S/P Partial Thyroidectomy 7/2008    S/P Tonsillectomy as child    Status post laser lithotripsy of ureteral calculus

## 2024-08-25 ENCOUNTER — TRANSCRIPTION ENCOUNTER (OUTPATIENT)
Age: 69
End: 2024-08-25

## 2024-08-26 ENCOUNTER — RESULT REVIEW (OUTPATIENT)
Age: 69
End: 2024-08-26

## 2024-08-26 ENCOUNTER — APPOINTMENT (OUTPATIENT)
Dept: SURGERY | Facility: HOSPITAL | Age: 69
End: 2024-08-26
Payer: MEDICARE

## 2024-08-26 ENCOUNTER — OUTPATIENT (OUTPATIENT)
Dept: OUTPATIENT SERVICES | Facility: HOSPITAL | Age: 69
LOS: 1 days | Discharge: HOME | End: 2024-08-26
Payer: MEDICARE

## 2024-08-26 ENCOUNTER — TRANSCRIPTION ENCOUNTER (OUTPATIENT)
Age: 69
End: 2024-08-26

## 2024-08-26 VITALS
SYSTOLIC BLOOD PRESSURE: 112 MMHG | RESPIRATION RATE: 15 BRPM | HEART RATE: 70 BPM | DIASTOLIC BLOOD PRESSURE: 70 MMHG | OXYGEN SATURATION: 95 %

## 2024-08-26 VITALS
RESPIRATION RATE: 16 BRPM | DIASTOLIC BLOOD PRESSURE: 88 MMHG | TEMPERATURE: 98 F | SYSTOLIC BLOOD PRESSURE: 135 MMHG | HEART RATE: 65 BPM | WEIGHT: 212.08 LBS | OXYGEN SATURATION: 98 % | HEIGHT: 67 IN

## 2024-08-26 DIAGNOSIS — Z96.642 PRESENCE OF LEFT ARTIFICIAL HIP JOINT: Chronic | ICD-10-CM

## 2024-08-26 DIAGNOSIS — E21.0 PRIMARY HYPERPARATHYROIDISM: ICD-10-CM

## 2024-08-26 DIAGNOSIS — Z98.890 OTHER SPECIFIED POSTPROCEDURAL STATES: Chronic | ICD-10-CM

## 2024-08-26 LAB
GLUCOSE BLDC GLUCOMTR-MCNC: 107 MG/DL — HIGH (ref 70–99)
PTH INTACT, INTRAOP SPECIMEN 2: SIGNIFICANT CHANGE UP
PTH INTACT, INTRAOP SPECIMEN 3: SIGNIFICANT CHANGE UP
PTH INTACT, INTRAOP SPECIMEN 4: SIGNIFICANT CHANGE UP
PTH INTACT, INTRAOP SPECIMEN 5: SIGNIFICANT CHANGE UP
PTH INTACT, INTRAOP SPECIMEN 6: SIGNIFICANT CHANGE UP
PTH INTACT, INTRAOP TIMING 2: SIGNIFICANT CHANGE UP
PTH INTACT, INTRAOP TIMING 3: SIGNIFICANT CHANGE UP
PTH INTACT, INTRAOP TIMING 4: SIGNIFICANT CHANGE UP
PTH INTACT, INTRAOP TIMING 5: SIGNIFICANT CHANGE UP
PTH INTACT, INTRAOP TIMING 6: SIGNIFICANT CHANGE UP
PTH INTACT, INTRAOPERATIVE 2: 55 PG/ML — SIGNIFICANT CHANGE UP (ref 15–65)
PTH INTACT, INTRAOPERATIVE 3: 51 PG/ML — SIGNIFICANT CHANGE UP (ref 15–65)
PTH INTACT, INTRAOPERATIVE 4: 37 PG/ML — SIGNIFICANT CHANGE UP (ref 15–65)
PTH INTACT, INTRAOPERATIVE 5: 34 PG/ML — SIGNIFICANT CHANGE UP (ref 15–65)
PTH INTACT, INTRAOPERATIVE 6: 33 PG/ML — SIGNIFICANT CHANGE UP (ref 15–65)
PTH-INTACT IO % DIF SERPL: 38 PG/ML — SIGNIFICANT CHANGE UP (ref 15–65)

## 2024-08-26 PROCEDURE — 60502 RE-EXPLORE PARATHYROIDS: CPT

## 2024-08-26 PROCEDURE — 88331 PATH CONSLTJ SURG 1 BLK 1SPC: CPT | Mod: 26

## 2024-08-26 PROCEDURE — 88305 TISSUE EXAM BY PATHOLOGIST: CPT | Mod: 26

## 2024-08-26 DEVICE — LIGATING CLIPS WECK HORIZON SMALL-WIDE (RED) 24: Type: IMPLANTABLE DEVICE | Status: FUNCTIONAL

## 2024-08-26 RX ORDER — BENZOCAINE/MENTHOL 20 %-0.5 %
1 AEROSOL (GRAM) TOPICAL
Refills: 0 | Status: DISCONTINUED | OUTPATIENT
Start: 2024-08-26 | End: 2024-08-27

## 2024-08-26 RX ORDER — FENTANYL CITRATE 50 UG/ML
50 INJECTION INTRAMUSCULAR; INTRAVENOUS
Refills: 0 | Status: DISCONTINUED | OUTPATIENT
Start: 2024-08-26 | End: 2024-08-27

## 2024-08-26 RX ORDER — ONDANSETRON 2 MG/ML
4 INJECTION, SOLUTION INTRAMUSCULAR; INTRAVENOUS ONCE
Refills: 0 | Status: DISCONTINUED | OUTPATIENT
Start: 2024-08-26 | End: 2024-08-27

## 2024-08-26 RX ORDER — CALCIUM CARBONATE 500(1250)
1 TABLET ORAL
Qty: 0 | Refills: 0 | DISCHARGE
Start: 2024-08-26

## 2024-08-26 RX ORDER — CALCIUM CARBONATE 500(1250)
1 TABLET ORAL EVERY 6 HOURS
Refills: 0 | Status: DISCONTINUED | OUTPATIENT
Start: 2024-08-26 | End: 2024-08-27

## 2024-08-26 RX ORDER — ACETAMINOPHEN 325 MG/1
1000 TABLET ORAL EVERY 6 HOURS
Refills: 0 | Status: DISCONTINUED | OUTPATIENT
Start: 2024-08-26 | End: 2024-08-27

## 2024-08-26 RX ORDER — ACETAMINOPHEN 325 MG/1
2 TABLET ORAL
Qty: 0 | Refills: 0 | DISCHARGE
Start: 2024-08-26

## 2024-08-26 RX ORDER — OXYCODONE HYDROCHLORIDE 5 MG/1
5 TABLET ORAL ONCE
Refills: 0 | Status: DISCONTINUED | OUTPATIENT
Start: 2024-08-26 | End: 2024-08-27

## 2024-08-26 RX ADMIN — Medication 1 TABLET(S): at 11:25

## 2024-08-26 NOTE — ASU DISCHARGE PLAN (ADULT/PEDIATRIC) - CARE PROVIDER_API CALL
Jose L Dennis  Surgery  [address] [place] [address] [address]  [place] [address]  Phone: [phone]  Fax: [phone]  Follow Up Time:

## 2024-08-26 NOTE — ASU DISCHARGE PLAN (ADULT/PEDIATRIC) - NS MD DC FALL RISK RISK
For information on Fall & Injury Prevention, visit: [email or web address] OR  [email or web address] OR  https://www.cdc.gov/steadi/patient.html

## 2024-08-29 LAB — SURGICAL PATHOLOGY STUDY: SIGNIFICANT CHANGE UP

## 2024-09-05 ENCOUNTER — APPOINTMENT (OUTPATIENT)
Dept: SURGERY | Facility: CLINIC | Age: 69
End: 2024-09-05
Payer: MEDICARE

## 2024-09-05 DIAGNOSIS — E21.3 HYPERPARATHYROIDISM, UNSPECIFIED: ICD-10-CM

## 2024-09-05 PROCEDURE — 36415 COLL VENOUS BLD VENIPUNCTURE: CPT

## 2024-09-05 PROCEDURE — 99024 POSTOP FOLLOW-UP VISIT: CPT

## 2024-09-05 NOTE — PHYSICAL EXAM
[de-identified] : well healed scar [Midline] : located in midline position [Normal] : orientation to person, place, and time: normal [de-identified] : Neg Chvosteks' sign

## 2024-09-06 ENCOUNTER — NON-APPOINTMENT (OUTPATIENT)
Age: 69
End: 2024-09-06

## 2024-09-06 LAB
25(OH)D3 SERPL-MCNC: 51.2 NG/ML
CALCIUM SERPL-MCNC: 10 MG/DL
CALCIUM SERPL-MCNC: 10 MG/DL
PARATHYROID HORMONE INTACT: 41 PG/ML

## 2025-01-08 ENCOUNTER — APPOINTMENT (OUTPATIENT)
Dept: ORTHOPEDIC SURGERY | Facility: CLINIC | Age: 70
End: 2025-01-08
Payer: MEDICARE

## 2025-01-08 DIAGNOSIS — M25.511 PAIN IN RIGHT SHOULDER: ICD-10-CM

## 2025-01-08 DIAGNOSIS — M75.101 UNSPECIFIED ROTATOR CUFF TEAR OR RUPTURE OF RIGHT SHOULDER, NOT SPECIFIED AS TRAUMATIC: ICD-10-CM

## 2025-01-08 PROCEDURE — 99213 OFFICE O/P EST LOW 20 MIN: CPT | Mod: 25

## 2025-01-08 PROCEDURE — 73030 X-RAY EXAM OF SHOULDER: CPT | Mod: RT

## 2025-01-08 PROCEDURE — 20610 DRAIN/INJ JOINT/BURSA W/O US: CPT | Mod: RT

## 2025-01-08 PROCEDURE — J3490M: CUSTOM | Mod: NC,JZ

## 2025-01-08 RX ORDER — EZETIMIBE 10 MG/1
TABLET ORAL
Refills: 0 | Status: ACTIVE | COMMUNITY

## 2025-01-08 RX ORDER — AZILSARTAN KAMEDOXOMIL 80 MG/1
TABLET ORAL
Refills: 0 | Status: ACTIVE | COMMUNITY

## 2025-01-08 RX ORDER — PREGABALIN 300 MG/1
CAPSULE ORAL
Refills: 0 | Status: ACTIVE | COMMUNITY

## 2025-04-09 ENCOUNTER — APPOINTMENT (OUTPATIENT)
Dept: ORTHOPEDIC SURGERY | Facility: CLINIC | Age: 70
End: 2025-04-09
Payer: MEDICARE

## 2025-04-09 VITALS — WEIGHT: 202 LBS | HEIGHT: 68 IN | BODY MASS INDEX: 30.62 KG/M2

## 2025-04-09 DIAGNOSIS — S70.00XA CONTUSION OF UNSPECIFIED HIP, INITIAL ENCOUNTER: ICD-10-CM

## 2025-04-09 PROCEDURE — 99213 OFFICE O/P EST LOW 20 MIN: CPT

## 2025-04-09 PROCEDURE — 73502 X-RAY EXAM HIP UNI 2-3 VIEWS: CPT

## (undated) DEVICE — LABELS BLANK W PEN

## (undated) DEVICE — SUT VICRYL PLUS 2-0 18" TIES UNDYED

## (undated) DEVICE — DRAPE LAPAROTOMY TRANSVERSE

## (undated) DEVICE — SUT VICRYL 3-0 18" TIES UNDYED

## (undated) DEVICE — BIPOLAR FORCEP CORD WECK STANDARD 12FT

## (undated) DEVICE — NDL HYPO SAFE 25G X 5/8" (ORANGE)

## (undated) DEVICE — PROTECTOR HEEL / ELBOW FLUFFY

## (undated) DEVICE — SUT CHROMIC 3-0 27" SH

## (undated) DEVICE — GLV 7 PROTEXIS (WHITE)

## (undated) DEVICE — ELCTR MONOPOLAR STIMULATOR PROBE FLUSH-TIP

## (undated) DEVICE — VENODYNE/SCD SLEEVE CALF MEDIUM

## (undated) DEVICE — DRAPE 3/4 SHEET 52X76"

## (undated) DEVICE — ELCTR GROUNDING PAD ADULT COVIDIEN

## (undated) DEVICE — DRSG BENZOIN 0.6CC

## (undated) DEVICE — CANISTER DISPOSABLE THIN WALL 3000CC

## (undated) DEVICE — PREP BETADINE KIT

## (undated) DEVICE — SOL IRR POUR H2O 500ML

## (undated) DEVICE — SUT MONOCRYL 4-0 27" PS-2 UNDYED

## (undated) DEVICE — ELCTR BOVIE PENCIL SMOKE EVACUATION

## (undated) DEVICE — PACK HEAD & NECK